# Patient Record
Sex: MALE | Race: WHITE | Employment: FULL TIME | URBAN - METROPOLITAN AREA
[De-identification: names, ages, dates, MRNs, and addresses within clinical notes are randomized per-mention and may not be internally consistent; named-entity substitution may affect disease eponyms.]

---

## 2019-01-17 ENCOUNTER — HOSPITAL ENCOUNTER (EMERGENCY)
Age: 34
Discharge: HOME | End: 2019-01-17
Attending: EMERGENCY MEDICINE

## 2019-01-17 VITALS
BODY MASS INDEX: 29.4 KG/M2 | TEMPERATURE: 97.9 F | DIASTOLIC BLOOD PRESSURE: 96 MMHG | WEIGHT: 210 LBS | HEART RATE: 74 BPM | HEIGHT: 71 IN | OXYGEN SATURATION: 98 % | SYSTOLIC BLOOD PRESSURE: 155 MMHG | RESPIRATION RATE: 14 BRPM

## 2019-01-17 DIAGNOSIS — R05.9 COUGH: Primary | ICD-10-CM

## 2019-01-17 PROCEDURE — 99283 EMERGENCY DEPT VISIT LOW MDM: CPT

## 2019-01-17 PROCEDURE — 6370000000 HC RX 637 (ALT 250 FOR IP): Performed by: EMERGENCY MEDICINE

## 2019-01-17 RX ORDER — AZITHROMYCIN 500 MG/1
500 TABLET, FILM COATED ORAL ONCE
Status: COMPLETED | OUTPATIENT
Start: 2019-01-17 | End: 2019-01-17

## 2019-01-17 RX ORDER — AZITHROMYCIN 250 MG/1
TABLET, FILM COATED ORAL
Qty: 1 PACKET | Refills: 0 | Status: SHIPPED | OUTPATIENT
Start: 2019-01-17 | End: 2019-01-27

## 2019-01-17 RX ADMIN — AZITHROMYCIN 500 MG: 500 TABLET, FILM COATED ORAL at 04:19

## 2019-01-17 ASSESSMENT — ENCOUNTER SYMPTOMS
COUGH: 1
STRIDOR: 0
NAUSEA: 0
TROUBLE SWALLOWING: 0
VOICE CHANGE: 0
BLOOD IN STOOL: 0
COLOR CHANGE: 0
FACIAL SWELLING: 0
BACK PAIN: 0
PHOTOPHOBIA: 0
SHORTNESS OF BREATH: 0
ABDOMINAL PAIN: 0
VOMITING: 1
WHEEZING: 0

## 2020-07-12 ENCOUNTER — HOSPITAL ENCOUNTER (EMERGENCY)
Age: 35
Discharge: HOME | End: 2020-07-12
Attending: STUDENT IN AN ORGANIZED HEALTH CARE EDUCATION/TRAINING PROGRAM
Payer: COMMERCIAL

## 2020-07-12 VITALS
DIASTOLIC BLOOD PRESSURE: 75 MMHG | SYSTOLIC BLOOD PRESSURE: 117 MMHG | HEIGHT: 71 IN | BODY MASS INDEX: 28.92 KG/M2 | HEART RATE: 42 BPM | TEMPERATURE: 98.8 F | OXYGEN SATURATION: 97 % | WEIGHT: 206.57 LBS | RESPIRATION RATE: 18 BRPM

## 2020-07-12 LAB
BILIRUBIN URINE: NEGATIVE
BLOOD, URINE: NEGATIVE
CLARITY: ABNORMAL
COLOR: ABNORMAL
GLUCOSE URINE: NEGATIVE MG/DL
KETONES, URINE: NEGATIVE MG/DL
LEUKOCYTE ESTERASE, URINE: NEGATIVE
MICROSCOPIC EXAMINATION: ABNORMAL
NITRITE, URINE: NEGATIVE
PH UA: 7.5 (ref 5–8)
PROTEIN UA: NEGATIVE MG/DL
SPECIFIC GRAVITY UA: 1.02 (ref 1–1.03)
URINE REFLEX TO CULTURE: ABNORMAL
URINE TYPE: ABNORMAL
UROBILINOGEN, URINE: 0.2 E.U./DL

## 2020-07-12 PROCEDURE — 87591 N.GONORRHOEAE DNA AMP PROB: CPT

## 2020-07-12 PROCEDURE — 99283 EMERGENCY DEPT VISIT LOW MDM: CPT

## 2020-07-12 PROCEDURE — 6360000002 HC RX W HCPCS: Performed by: STUDENT IN AN ORGANIZED HEALTH CARE EDUCATION/TRAINING PROGRAM

## 2020-07-12 PROCEDURE — 96372 THER/PROPH/DIAG INJ SC/IM: CPT

## 2020-07-12 PROCEDURE — 81003 URINALYSIS AUTO W/O SCOPE: CPT

## 2020-07-12 PROCEDURE — 6370000000 HC RX 637 (ALT 250 FOR IP): Performed by: STUDENT IN AN ORGANIZED HEALTH CARE EDUCATION/TRAINING PROGRAM

## 2020-07-12 PROCEDURE — 87491 CHLMYD TRACH DNA AMP PROBE: CPT

## 2020-07-12 RX ORDER — CEFTRIAXONE SODIUM 250 MG/1
250 INJECTION, POWDER, FOR SOLUTION INTRAMUSCULAR; INTRAVENOUS ONCE
Status: COMPLETED | OUTPATIENT
Start: 2020-07-12 | End: 2020-07-12

## 2020-07-12 RX ORDER — AZITHROMYCIN 500 MG/1
1000 TABLET, FILM COATED ORAL ONCE
Status: COMPLETED | OUTPATIENT
Start: 2020-07-12 | End: 2020-07-12

## 2020-07-12 RX ADMIN — CEFTRIAXONE SODIUM 250 MG: 250 INJECTION, POWDER, FOR SOLUTION INTRAMUSCULAR; INTRAVENOUS at 14:33

## 2020-07-12 RX ADMIN — AZITHROMYCIN 1000 MG: 500 TABLET, FILM COATED ORAL at 14:33

## 2020-07-12 NOTE — ED PROVIDER NOTES
member of club or organization: None     Attends meetings of clubs or organizations: None     Relationship status: None    Intimate partner violence     Fear of current or ex partner: None     Emotionally abused: None     Physically abused: None     Forced sexual activity: None   Other Topics Concern    None   Social History Narrative    None        Review of Systems   10 total systems reviewed and found to be negative unless otherwise noted in HPI     Physical Exam   /75   Pulse (!) 42   Temp 98.8 °F (37.1 °C) (Oral)   Resp 18   Ht 5' 11\" (1.803 m)   Wt 206 lb 9.1 oz (93.7 kg)   SpO2 97%   BMI 28.81 kg/m²      CONSTITUTIONAL: Well appearing, in no acute distress   HEAD: atraumatic, normocephalic   EYES: PERRL, No injection, discharge or scleral icterus. ENT: Moist mucous membranes. NECK: Normal ROM, NO LAD   CARDIOVASCULAR: Regular rate and rhythm. No murmurs or gallop. PULMONARY/CHEST: Airway patent. No retractions. Breath sounds clear with good air entry bilaterally. ABDOMEN: Soft, Non-distended and non-tender, without guarding or rebound.  exam with no rash but mild discharge  SKIN: Acyanotic, warm, dry   MUSCULOSKELETAL: No swelling, tenderness or deformity   NEUROLOGICAL: Awake and oriented x 3. Pulses intact. Grossly nonfocal   Nursing note and vitals reviewed.      ED Course & Medical Decision Making   Medications   cefTRIAXone (ROCEPHIN) injection 250 mg (250 mg Intramuscular Given 7/12/20 1433)   azithromycin (ZITHROMAX) tablet 1,000 mg (1,000 mg Oral Given 7/12/20 1433)      Labs Reviewed   URINE RT REFLEX TO CULTURE - Abnormal; Notable for the following components:       Result Value    Clarity, UA SL CLOUDY (*)     All other components within normal limits    Narrative:     Performed at:  [facility]  [address] [facility]   Phone [phone]   C.TRACHOMATIS N.GONORRHOEAE DNA, URINE      No orders to display PROCEDURES:   Procedures    ASSESSMENT AND PLAN:  Dannielle Piedra is a 36 y.o. male who presents with concern for STIs. His exam was unremarkable and there were no lesions but mild drainage from the penis. Given no systemic signs of infection there was no indication for extensive labs. We did obtain a UA for STD check. Nonetheless, patient was empirically treated with IM ceftriaxone as well as azithromycin thousand milligrams. We recommended no sexual activity in the next 2 weeks and recommended treatment of his partner. Did offer an HIV, syphilis tests as well as other STD test but patient refused. Was discharged home in stable condition. ClINICAL IMPRESSION:  1. STD (female)          PATIENT REFERRED TO:  [facility]) Pre-Services  [phone]  Schedule an appointment as soon as possible for a visit in 2 days        DISCHARGE MEDICATIONS:  There are no discharge medications for this patient. DISCONTINUED MEDICATIONS:  There are no discharge medications for this patient. DISPOSITION    Discharge home  -We have instructed the patient, Dannielle Piedra) to return to the ED or call him PCP if his pain/symptoms worsen. -Findings and recommendations explained to patient. He expressed understanding and agreed with the plan. Nusrat Brink MD (electronically signed)  7/12/2020  _________________________________________________________________________________________  _________________________________________________________________________________________  This record is transcribed utilizing voice recognition technology. There are inherent limitations in this technology. In addition, there may be limitations in editing of this report. If there are any discrepancies, please contact me directly.         Nusrat Brink MD  07/12/20 3541

## 2020-07-14 LAB
C. TRACHOMATIS DNA ,URINE: NEGATIVE
N. GONORRHOEAE DNA, URINE: NEGATIVE

## 2025-07-01 ENCOUNTER — OFFICE VISIT (OUTPATIENT)
Age: 40
End: 2025-07-01

## 2025-07-01 VITALS
OXYGEN SATURATION: 97 % | SYSTOLIC BLOOD PRESSURE: 124 MMHG | TEMPERATURE: 98.4 F | DIASTOLIC BLOOD PRESSURE: 73 MMHG | HEIGHT: 71 IN | HEART RATE: 68 BPM | RESPIRATION RATE: 18 BRPM | WEIGHT: 204.6 LBS | BODY MASS INDEX: 28.64 KG/M2

## 2025-07-01 DIAGNOSIS — H10.32 ACUTE CONJUNCTIVITIS OF LEFT EYE, UNSPECIFIED ACUTE CONJUNCTIVITIS TYPE: Primary | ICD-10-CM

## 2025-07-01 DIAGNOSIS — H00.014 HORDEOLUM EXTERNUM LEFT UPPER EYELID: ICD-10-CM

## 2025-07-01 RX ORDER — ERYTHROMYCIN 5 MG/G
OINTMENT OPHTHALMIC
Qty: 1 EACH | Refills: 0 | Status: SHIPPED | OUTPATIENT
Start: 2025-07-01 | End: 2025-07-11

## 2025-07-01 ASSESSMENT — ENCOUNTER SYMPTOMS
EYE PAIN: 1
EYE REDNESS: 0
EYE DISCHARGE: 1

## 2025-07-01 NOTE — PROGRESS NOTES
Jay Tejeda (: 1985) is a 39 y.o. male, New patient, here for evaluation of the following chief complaint(s):  Eye Pain (L eye pain/swelling x 2 days )      ASSESSMENT/PLAN:    ICD-10-CM    1. Hordeolum externum left upper eyelid  H00.014       2. Acute conjunctivitis of left eye, unspecified acute conjunctivitis type  H10.32           Warm compress to left eyelid daily at least 6-10 times.  Discussed PCP follow up for persisting or worsening symptoms, or to return to the clinic if unable to obtain PCP follow up for worsening symptoms.    Reviewed AVS with treatment instructions and answered questions - patient acknowledges understanding and agreement with the discussed treatment plan and AVS instructions.          SUBJECTIVE/OBJECTIVE:  Patient  is here with upper eye lid edema that started 2 days ago.  Patient has had styes in the past. Patient has used OTC eye wipes.   Patient rates pain 5/110      Eye Pain   Associated symptoms include an eye discharge. Pertinent negatives include no eye redness.     VITAL SIGNS  Vitals:    25 1745   BP: 124/73   Pulse: 68   Resp: 18   Temp: 98.4 °F (36.9 °C)   TempSrc: Oral   SpO2: 97%   Weight: 92.8 kg (204 lb 9.6 oz)   Height: 1.803 m (5' 11\")       Review of Systems   Eyes:  Positive for pain and discharge. Negative for redness.   All other systems reviewed and are negative.    Pertinent positives and negatives as above, or may be included within the HPI.    Physical Exam  Vitals reviewed.   Constitutional:       Appearance: Normal appearance.   HENT:      Head: Normocephalic.   Eyes:      General:         Left eye: Discharge present.     Comments: Left eye drainage this AM, upon waking this Am left lid upper with redness and edema no injury no blurry vision.   Cardiovascular:      Rate and Rhythm: Normal rate and regular rhythm.      Heart sounds: Normal heart sounds.   Pulmonary:      Effort: Pulmonary effort is normal.      Breath sounds: Normal